# Patient Record
Sex: FEMALE | Race: WHITE | Employment: PART TIME | URBAN - METROPOLITAN AREA
[De-identification: names, ages, dates, MRNs, and addresses within clinical notes are randomized per-mention and may not be internally consistent; named-entity substitution may affect disease eponyms.]

---

## 2018-04-23 ENCOUNTER — HOSPITAL ENCOUNTER (OUTPATIENT)
Age: 63
Discharge: HOME | End: 2018-04-25
Payer: COMMERCIAL

## 2018-04-23 LAB
ALBUMIN SERPL-MCNC: 4.2 G/DL (ref 3.5–5.2)
ALP BLD-CCNC: 151 U/L (ref 35–104)
ALT SERPL-CCNC: 85 U/L (ref 0–32)
ANION GAP SERPL CALCULATED.3IONS-SCNC: 13 MMOL/L (ref 7–16)
AST SERPL-CCNC: 80 U/L (ref 0–31)
BILIRUB SERPL-MCNC: 0.5 MG/DL (ref 0–1.2)
BILIRUBIN DIRECT: <0.2 MG/DL (ref 0–0.3)
BILIRUBIN, INDIRECT: ABNORMAL MG/DL (ref 0–1)
BUN BLDV-MCNC: 13 MG/DL (ref 8–23)
CALCIUM SERPL-MCNC: 10.2 MG/DL (ref 8.6–10.2)
CHLORIDE BLD-SCNC: 101 MMOL/L (ref 98–107)
CO2: 26 MMOL/L (ref 22–29)
CREAT SERPL-MCNC: 0.7 MG/DL (ref 0.5–1)
GFR AFRICAN AMERICAN: >60
GFR NON-AFRICAN AMERICAN: >60 ML/MIN/1.73
GLUCOSE BLD-MCNC: 104 MG/DL (ref 74–109)
PHOSPHORUS: 3.1 MG/DL (ref 2.5–4.5)
POTASSIUM SERPL-SCNC: 4.3 MMOL/L (ref 3.5–5)
SODIUM BLD-SCNC: 140 MMOL/L (ref 132–146)
TOTAL PROTEIN: 7.2 G/DL (ref 6.4–8.3)
TSH SERPL DL<=0.05 MIU/L-ACNC: 2.3 UIU/ML (ref 0.27–4.2)

## 2018-04-23 PROCEDURE — 82247 BILIRUBIN TOTAL: CPT

## 2018-04-23 PROCEDURE — 84450 TRANSFERASE (AST) (SGOT): CPT

## 2018-04-23 PROCEDURE — 84460 ALANINE AMINO (ALT) (SGPT): CPT

## 2018-04-23 PROCEDURE — 84443 ASSAY THYROID STIM HORMONE: CPT

## 2018-04-23 PROCEDURE — 80069 RENAL FUNCTION PANEL: CPT

## 2018-04-23 PROCEDURE — 84155 ASSAY OF PROTEIN SERUM: CPT

## 2018-04-23 PROCEDURE — 84075 ASSAY ALKALINE PHOSPHATASE: CPT

## 2018-04-23 PROCEDURE — 82248 BILIRUBIN DIRECT: CPT

## 2018-04-23 PROCEDURE — 36415 COLL VENOUS BLD VENIPUNCTURE: CPT

## 2018-07-20 ENCOUNTER — HOSPITAL ENCOUNTER (OUTPATIENT)
Age: 63
Discharge: HOME | End: 2018-07-22
Admitting: INTERNAL MEDICINE
Payer: COMMERCIAL

## 2018-07-20 PROCEDURE — 84075 ASSAY ALKALINE PHOSPHATASE: CPT

## 2018-07-20 PROCEDURE — 84460 ALANINE AMINO (ALT) (SGPT): CPT

## 2018-07-20 PROCEDURE — 80069 RENAL FUNCTION PANEL: CPT

## 2018-07-20 PROCEDURE — 84443 ASSAY THYROID STIM HORMONE: CPT

## 2018-07-20 PROCEDURE — 82248 BILIRUBIN DIRECT: CPT

## 2018-07-20 PROCEDURE — 82247 BILIRUBIN TOTAL: CPT

## 2018-07-20 PROCEDURE — 84155 ASSAY OF PROTEIN SERUM: CPT

## 2018-07-20 PROCEDURE — 84450 TRANSFERASE (AST) (SGOT): CPT

## 2018-07-20 PROCEDURE — 36415 COLL VENOUS BLD VENIPUNCTURE: CPT

## 2018-07-21 LAB
ALBUMIN SERPL-MCNC: 4.3 G/DL (ref 3.5–5.2)
ALP BLD-CCNC: 173 U/L (ref 35–104)
ALT SERPL-CCNC: 72 U/L (ref 0–32)
ANION GAP SERPL CALCULATED.3IONS-SCNC: 14 MMOL/L (ref 7–16)
AST SERPL-CCNC: 65 U/L (ref 0–31)
BILIRUB SERPL-MCNC: 0.5 MG/DL (ref 0–1.2)
BILIRUBIN DIRECT: <0.2 MG/DL (ref 0–0.3)
BILIRUBIN, INDIRECT: ABNORMAL MG/DL (ref 0–1)
BUN BLDV-MCNC: 11 MG/DL (ref 8–23)
CALCIUM SERPL-MCNC: 10.6 MG/DL (ref 8.6–10.2)
CHLORIDE BLD-SCNC: 102 MMOL/L (ref 98–107)
CO2: 27 MMOL/L (ref 22–29)
CREAT SERPL-MCNC: 0.7 MG/DL (ref 0.5–1)
GFR AFRICAN AMERICAN: >60
GFR NON-AFRICAN AMERICAN: >60 ML/MIN/1.73
GLUCOSE BLD-MCNC: 97 MG/DL (ref 74–109)
PHOSPHORUS: 2.9 MG/DL (ref 2.5–4.5)
POTASSIUM SERPL-SCNC: 4.2 MMOL/L (ref 3.5–5)
SODIUM BLD-SCNC: 143 MMOL/L (ref 132–146)
TOTAL PROTEIN: 7.3 G/DL (ref 6.4–8.3)
TSH SERPL DL<=0.05 MIU/L-ACNC: 4.65 UIU/ML (ref 0.27–4.2)

## 2018-07-24 ENCOUNTER — HOSPITAL ENCOUNTER (OUTPATIENT)
Age: 63
Discharge: HOME | End: 2018-07-26
Payer: COMMERCIAL

## 2018-07-24 PROCEDURE — G0328 FECAL BLOOD SCRN IMMUNOASSAY: HCPCS

## 2018-07-25 LAB — OCCULT BLOOD SCREENING: NORMAL

## 2018-08-31 ENCOUNTER — HOSPITAL ENCOUNTER (EMERGENCY)
Age: 63
Discharge: HOME | End: 2018-08-31
Payer: COMMERCIAL

## 2018-08-31 ENCOUNTER — APPOINTMENT (OUTPATIENT)
Dept: GENERAL RADIOLOGY | Age: 63
End: 2018-08-31
Payer: COMMERCIAL

## 2018-08-31 VITALS
OXYGEN SATURATION: 97 % | SYSTOLIC BLOOD PRESSURE: 129 MMHG | WEIGHT: 218 LBS | DIASTOLIC BLOOD PRESSURE: 70 MMHG | TEMPERATURE: 97.8 F | HEART RATE: 91 BPM | RESPIRATION RATE: 16 BRPM

## 2018-08-31 DIAGNOSIS — S42.92XA CLOSED FRACTURE OF LEFT SHOULDER, INITIAL ENCOUNTER: Primary | ICD-10-CM

## 2018-08-31 PROCEDURE — 99212 OFFICE O/P EST SF 10 MIN: CPT

## 2018-08-31 PROCEDURE — 73060 X-RAY EXAM OF HUMERUS: CPT

## 2018-08-31 PROCEDURE — 73030 X-RAY EXAM OF SHOULDER: CPT

## 2018-08-31 RX ORDER — OXYCODONE HYDROCHLORIDE AND ACETAMINOPHEN 5; 325 MG/1; MG/1
1 TABLET ORAL EVERY 6 HOURS PRN
Qty: 12 TABLET | Refills: 0 | Status: SHIPPED | OUTPATIENT
Start: 2018-08-31 | End: 2018-09-03

## 2018-08-31 RX ORDER — PAROXETINE HYDROCHLORIDE 20 MG/1
20 TABLET, FILM COATED ORAL EVERY MORNING
COMMUNITY

## 2018-08-31 RX ORDER — LEVOTHYROXINE SODIUM 0.1 MG/1
100 TABLET ORAL DAILY
COMMUNITY

## 2018-09-04 ENCOUNTER — OFFICE VISIT (OUTPATIENT)
Dept: ORTHOPEDIC SURGERY | Age: 63
End: 2018-09-04
Payer: COMMERCIAL

## 2018-09-04 VITALS — HEIGHT: 63 IN | TEMPERATURE: 98 F | WEIGHT: 218 LBS | BODY MASS INDEX: 38.62 KG/M2

## 2018-09-04 DIAGNOSIS — S42.292A OTHER CLOSED DISPLACED FRACTURE OF PROXIMAL END OF LEFT HUMERUS, INITIAL ENCOUNTER: Primary | ICD-10-CM

## 2018-09-04 PROCEDURE — 23600 CLTX PROX HUMRL FX W/O MNPJ: CPT | Performed by: ORTHOPAEDIC SURGERY

## 2018-09-04 PROCEDURE — 99204 OFFICE O/P NEW MOD 45 MIN: CPT | Performed by: ORTHOPAEDIC SURGERY

## 2018-09-04 RX ORDER — OXYCODONE HYDROCHLORIDE AND ACETAMINOPHEN 5; 325 MG/1; MG/1
1 TABLET ORAL EVERY 6 HOURS PRN
Qty: 28 TABLET | Refills: 0 | Status: SHIPPED | OUTPATIENT
Start: 2018-09-04 | End: 2018-09-11 | Stop reason: SDUPTHER

## 2018-09-04 NOTE — PROGRESS NOTES
cancer. Musculoskeletal: (-) fractures,  (-) dislocations,(-) collagen vascular disease, (-) fibromyalgia, (-) multiple sclerosis, (-) muscular dystrophy, (-) RSD,(-) joint pain (-)swelling, (-) joint pain,swelling. Neurologic: (-) epilepsy, (-)seizures,(-) brain tumor,(-) TIA, (-)stroke, (-)headaches, (-)Parkinson disease,(-) memory loss, (-) LOC. Cardiovascular: (-) Chest pain, (-) swelling in legs/feet, (-) SOB, (-) cramping in legs/feet with walking. Respiratory: (-) SOB, (-) Coughing, (-) night sweats. GI: (-) nausea, (-) vomiting, (-) diarrhea, (-) blood in stool, (-) gastric ulcer. Psychiatric: (-) Depression, (-) Anxiety, (-) bipolar disease, (-) Alzheimer's Disease  Allergic/Immunologic: (-) allergies latex, (-) allergies metal, (-) skin sensitivity. Hematlogic: (-) anemia, (-) blood transfusion, (-) DVT/PE, (-) Clotting disorders      Subjective:    Constitution:  Temp 98 °F (36.7 °C)   Ht 5' 3\" (1.6 m)   Wt 218 lb (98.9 kg)   BMI 38.62 kg/m²       Psycihatric:  The patient is alert and oriented x 3, appears to be stated age and in no distress. Respiratory:  Respiratory effort is not labored. Patient is not gasping. Palpation of the chest reveals no tactile fremitus. Skin:  Upon inspection: the skin appears warm, dry and intact. There is not a previous scar over the affected area. There is not any cellulitis, lymphedema or cutaneous lesions noted in the lower extremities. Upon palpation there is no induration noted. Neurologic:  Motor exam of the upper extremities show: The reflexes in biceps/triceps/brachioradialis are equal and symmetric. Sensory exam C5-T1 are normal bilaterally. Cardiovascular: The vascular exam is normal and is well perfused to distal extremities. There are 2+ radial pulses bilaterally, and motor and sensation is intact to median, ulnar, and radial, musclocutaneus, and axillary nerve distribution and grossly symmetric bilaterally.   There is cap refill noted less than two seconds in all digits. There is not edema of the bilateral upper extremities. There is not varicosities noted in the distal extremities. Lymph:  Upon palpation,  there is no lymphadenopathy noted in bilateral upper extremities. Musculoskeletal:  Gait: normal; examination of the nails and digits reveal no cyanosis or clubbing. Cervical Exam:  On physical exam, Zoila Boykin is well-developed, well-nourished, oriented to person, place and time. her gait is normal.  On evaluation of hercervical spine, She has full range of motion of the cervical spine without pain. There is no cervical tenderness to palpation. Shoulder Exam:  On evaluation of her bilaterally upper extremities, her left shoulder has no deformity. There is tenderness upon palpation of the proximal humersus. There is not evidence of scapular dyskinesis. There is not muscle atrophy in shoulder girdle. The range of motion for the Right Shoulder is 160/45/t10 and for the Left shoulder range was not assessed due to injury. Right shoulder Motor strength is 5/5 in the supraspinatus, 5/5 internal rotation and 5/5 in external rotation, and Left shoulder motor strength n/a/5 in supraspinatus, n/a/5 in internal rotation, n/a/5 in external rotation. Right shoulder:  negative Impingement , negative Olivares ,negative  Speeds,negative  Apprehension ,negative Sawyer Load Shift, negative Lyn manuver, negative Cross arm test.     Left shoulder:  Skin intact  Sensation intact  +2 radial pulses  AROM fingers  R/U/M nerves intact    XRAY:    Redemonstrated is a comminuted, impacted, mildly displaced left   proximal humerus fracture through the surgical neck, not significantly   change in alignment. The glenohumeral and acromioclavicular joints are   maintained. Visualized portion of the left lung is grossly clear.          MRI:  na    Radiographic findings reviewed with patient    Impression:   Encounter

## 2018-09-04 NOTE — PROGRESS NOTES
Viridiana Young is a 58y.o. year old {Desc; male/female:67665} who presents today for evaluation of a {DESC; LEFT/RIGHT/BI:16000} *** injury which occurred on ***. The patient reports that this injury occurred when ***. The patient denies any other injuries. Movement makes the pain worse, the splint and resting makes the pain better. The patient's past medical history, medications, and review of systems was reviewed. On Physical Exam, Viridiana Young is well-developed, well-nourished, and oriented to person, place and time. her gait is intact. On evaluation of her {DESC; LEFT/RIGHT/BI:24319} {Desc; upper/lower:40455} extremity, there {IS/IS NOT:9024} obvious deformity. There is swelling and {IS/IS NOT:9024} ecchymosis. she is tender to palpation over the ***, and otherwise nontender over the remainder of the extremity. Range of motion is decreased secondary to pain over the {DESC; LEFT/RIGHT/BI:75335} ***. The skin overlying the {DESC; LEFT/RIGHT/BI:21016} *** is intact without evidence of lesion, laceration or abrasion. Distal pulses are 2+ and symmetric bilaterally. Sensation is grossly intact to light touch and symmetric bilaterally. Xrays:  Xrays dated *** were reviewed.   ***    Impression:  {DESC; LEFT/RIGHT/BI:39813} *** Fracture    Plan: ***

## 2018-09-10 DIAGNOSIS — M25.512 ACUTE PAIN OF LEFT SHOULDER: Primary | ICD-10-CM

## 2018-09-11 ENCOUNTER — OFFICE VISIT (OUTPATIENT)
Dept: ORTHOPEDIC SURGERY | Age: 63
End: 2018-09-11

## 2018-09-11 VITALS — HEIGHT: 63 IN | BODY MASS INDEX: 38.62 KG/M2 | TEMPERATURE: 98 F | WEIGHT: 218 LBS

## 2018-09-11 DIAGNOSIS — S42.292A OTHER CLOSED DISPLACED FRACTURE OF PROXIMAL END OF LEFT HUMERUS, INITIAL ENCOUNTER: Primary | ICD-10-CM

## 2018-09-11 PROCEDURE — 99024 POSTOP FOLLOW-UP VISIT: CPT | Performed by: ORTHOPAEDIC SURGERY

## 2018-09-11 RX ORDER — OXYCODONE HYDROCHLORIDE AND ACETAMINOPHEN 5; 325 MG/1; MG/1
1 TABLET ORAL EVERY 6 HOURS PRN
Qty: 28 TABLET | Refills: 0 | Status: SHIPPED | OUTPATIENT
Start: 2018-09-11 | End: 2018-09-18

## 2018-09-11 NOTE — PROGRESS NOTES
Chief Complaint   Patient presents with    Arm Pain     Left arm, Humerus Fx, DOI 8/31/18        Catie Montes is a 58y.o. year old   female who is seen today  for evaluation of left humerus fracture. She has been compliant in sling and non weight bearing. Chief Complaint   Patient presents with    Arm Pain     Left arm, Humerus Fx, DOI 8/31/18     Past Medical History:   Diagnosis Date    Thyroid disease      Past Surgical History:   Procedure Laterality Date    FRACTURE SURGERY      toe    HYSTERECTOMY      THYROIDECTOMY         Current Outpatient Prescriptions:     oxyCODONE-acetaminophen (PERCOCET) 5-325 MG per tablet, Take 1 tablet by mouth every 6 hours as needed for Pain for up to 7 days. ., Disp: 28 tablet, Rfl: 0    PARoxetine (PAXIL) 20 MG tablet, Take 20 mg by mouth every morning, Disp: , Rfl:     levothyroxine (SYNTHROID) 100 MCG tablet, Take 100 mcg by mouth Daily, Disp: , Rfl:   No Known Allergies  Social History     Social History    Marital status:      Spouse name: N/A    Number of children: N/A    Years of education: N/A     Occupational History    Not on file. Social History Main Topics    Smoking status: Never Smoker    Smokeless tobacco: Not on file    Alcohol use No    Drug use: No    Sexual activity: No     Other Topics Concern    Not on file     Social History Narrative    No narrative on file     History reviewed. No pertinent family history. REVIEW OF SYSTEMS:     General/Constitution:  (-)weight loss, (-)fever, (-)chills, (-)weakness. Skin: (-) rash,(-) psoriasis,(-) eczema, (-)skin cancer. Musculoskeletal: (-) fractures,  (-) dislocations,(-) collagen vascular disease, (-) fibromyalgia, (-) multiple sclerosis, (-) muscular dystrophy, (-) RSD,(-) joint pain (-)swelling, (-) joint pain,swelling.   Neurologic: (-) epilepsy, (-)seizures,(-) brain tumor,(-) TIA, (-)stroke, (-)headaches, (-)Parkinson disease,(-) memory loss, (-)

## 2018-09-19 ENCOUNTER — TELEPHONE (OUTPATIENT)
Dept: ORTHOPEDIC SURGERY | Age: 63
End: 2018-09-19

## 2018-09-19 DIAGNOSIS — S42.292A OTHER CLOSED DISPLACED FRACTURE OF PROXIMAL END OF LEFT HUMERUS, INITIAL ENCOUNTER: Primary | ICD-10-CM

## 2018-09-19 RX ORDER — OXYCODONE AND ACETAMINOPHEN 10; 325 MG/1; MG/1
1 TABLET ORAL EVERY 6 HOURS PRN
Qty: 28 TABLET | Refills: 0 | Status: SHIPPED | OUTPATIENT
Start: 2018-09-19 | End: 2018-09-27 | Stop reason: SDUPTHER

## 2018-09-19 NOTE — TELEPHONE ENCOUNTER
I increased her pain medication to percocet 10/325. Please tell her to be sure to take one before she goes to bed as that will help with the night pain.

## 2018-09-27 ENCOUNTER — TELEPHONE (OUTPATIENT)
Dept: ORTHOPEDIC SURGERY | Age: 63
End: 2018-09-27

## 2018-09-27 DIAGNOSIS — S42.292A OTHER CLOSED DISPLACED FRACTURE OF PROXIMAL END OF LEFT HUMERUS, INITIAL ENCOUNTER: ICD-10-CM

## 2018-09-27 RX ORDER — OXYCODONE AND ACETAMINOPHEN 10; 325 MG/1; MG/1
1 TABLET ORAL EVERY 6 HOURS PRN
Qty: 28 TABLET | Refills: 0 | Status: SHIPPED | OUTPATIENT
Start: 2018-09-27 | End: 2018-10-04

## 2018-09-27 NOTE — TELEPHONE ENCOUNTER
.Patient called in requesting refill of Oxycodone  mg    [facility] [address] [place], [place] - [address]

## 2018-10-08 DIAGNOSIS — M25.512 ACUTE PAIN OF LEFT SHOULDER: Primary | ICD-10-CM

## 2018-10-09 ENCOUNTER — OFFICE VISIT (OUTPATIENT)
Dept: ORTHOPEDIC SURGERY | Age: 63
End: 2018-10-09

## 2018-10-09 VITALS — TEMPERATURE: 98 F | BODY MASS INDEX: 38.62 KG/M2 | WEIGHT: 218 LBS | HEIGHT: 63 IN

## 2018-10-09 DIAGNOSIS — S42.292A OTHER CLOSED DISPLACED FRACTURE OF PROXIMAL END OF LEFT HUMERUS, INITIAL ENCOUNTER: Primary | ICD-10-CM

## 2018-10-09 PROCEDURE — 99024 POSTOP FOLLOW-UP VISIT: CPT | Performed by: ORTHOPAEDIC SURGERY

## 2018-10-23 ENCOUNTER — EVALUATION (OUTPATIENT)
Dept: PHYSICAL THERAPY | Age: 63
End: 2018-10-23
Payer: COMMERCIAL

## 2018-10-23 DIAGNOSIS — S42.292D CLOSED FRACTURE OF HEAD OF LEFT HUMERUS WITH ROUTINE HEALING, SUBSEQUENT ENCOUNTER: Primary | ICD-10-CM

## 2018-10-23 PROCEDURE — 97110 THERAPEUTIC EXERCISES: CPT | Performed by: PHYSICAL THERAPIST

## 2018-10-23 PROCEDURE — 97162 PT EVAL MOD COMPLEX 30 MIN: CPT | Performed by: PHYSICAL THERAPIST

## 2018-10-23 NOTE — PROGRESS NOTES
[facility] OUTPATIENT REHABILITATION  PHYSICAL THERAPY INITIAL EVALUATION         Date:  10/23/2018   Patient: Quita Schaeffer  : 1955  MRN: [id number]  Referring Provider: Alec Jeff DO   [facility]     Medical Diagnosis:   L10.019Q (ICD-10-CM) - Other closed displaced fracture of proximal end of left humerus, initial encounter    Onset date: 18    Onset[de-identified] Insidious onset    Mechanism of Injury: fell off [employer]    Chief complaint: pain and can't use L UE     SUBJECTIVE:     Pat Medical History  Past Medical History:   Diagnosis Date    Thyroid disease      Past Surgical History:   Procedure Laterality Date    FRACTURE SURGERY      toe    HYSTERECTOMY      THYROIDECTOMY         Medications:   Current Outpatient Prescriptions   Medication Sig Dispense Refill    PARoxetine (PAXIL) 20 MG tablet Take 20 mg by mouth every morning      levothyroxine (SYNTHROID) 100 MCG tablet Take 100 mcg by mouth Daily       No current facility-administered medications for this visit. Imaging results: Xr Shoulder Left (min 2 Views)    Result Date: 10/9/2018  Reading location: 200 INDICATION: Fracture, follow-up FINDINGS: 3 views left shoulder compared 2018. Some healing callus formation and impaction at the fracture site. Intact glenohumeral and acromioclavicular alignment. Interval impaction and healing callus formation at proximal left humerus fracture. 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

## 2018-10-24 ENCOUNTER — TREATMENT (OUTPATIENT)
Dept: PHYSICAL THERAPY | Age: 63
End: 2018-10-24
Payer: COMMERCIAL

## 2018-10-24 DIAGNOSIS — S42.292D CLOSED FRACTURE OF HEAD OF LEFT HUMERUS WITH ROUTINE HEALING, SUBSEQUENT ENCOUNTER: Primary | ICD-10-CM

## 2018-10-24 PROCEDURE — 97110 THERAPEUTIC EXERCISES: CPT

## 2018-10-30 ENCOUNTER — TREATMENT (OUTPATIENT)
Dept: PHYSICAL THERAPY | Age: 63
End: 2018-10-30
Payer: COMMERCIAL

## 2018-10-30 DIAGNOSIS — S42.292D CLOSED FRACTURE OF HEAD OF LEFT HUMERUS WITH ROUTINE HEALING, SUBSEQUENT ENCOUNTER: Primary | ICD-10-CM

## 2018-10-30 PROCEDURE — 97110 THERAPEUTIC EXERCISES: CPT

## 2018-10-30 NOTE — PROGRESS NOTES
Physical Therapy Daily Treatment Note    Date: 10/30/2018  Patient Name: Helio Garcia  : 1955   MRN: [id number]  DOInjury: 18  DOSx: none   Referring Provider:     Medical Diagnosis:   Geovany Garcia, [address], [place]        G34.151U (ICD-10-CM) - Other closed displaced fracture of proximal end of left humerus, initial encounter    Outcome Measure:     S: Pt reported dull pain all the time that increased with reaching in front, behind, doing hair, and when getting dressed. Pain is located below shoulder in bicep region. O:  Time 900-1005     Visit 3     Pain 5/10     ROM Left Shoulder:  AROM: 83° Forward elevation, 30° ER in 60 ABD in scapular plane, IR to L5 w/ marked pain  PROM: 110° Forward elevation, 60° ER in scapular plane , 45° IR     Modalities      Ice 15 min     Manual                  Stretch      Table slides      Wall Flexion      Wall ER stretch      Towel IR stretch      IR reaching behind back      Exercise      Shrugs AROM      Pendulum Ex      UBE      Pulleys - flex 5 minutes     Pulleys-IR      Supine wand chest press 2 x 20     Supine wand flex 2 x 20     Supine wand ER/IR 2 x 20     Standing wand IR across back 2 x 20     S-lying ABD      S-lying ER      Standing wand flex      Standing flexion      Standing ABD            ROWS:  Functional activities  To aid in ROM and strength needed for reaching , lifting ,pushing and pulling at home/work    ROWS: M  \"    ROWS: L  \"    ER  \"    IR  \"                A:  Tolerated treatment well.   P: Continue with rehab plan  Magnolia Reveles PTA    Treatment Charges: Mins Units   Initial Evaluation     Re-Evaluation     Ther Exercise         TE 45 3   Manual Therapy     MT     Ther Activities        TA     Gait Training          GT     Neuro Re-education NR     Modalities     Non-Billable Service Time 15    Other     Total Time/Units 60 3

## 2018-11-01 ENCOUNTER — TREATMENT (OUTPATIENT)
Dept: PHYSICAL THERAPY | Age: 63
End: 2018-11-01
Payer: COMMERCIAL

## 2018-11-01 DIAGNOSIS — S42.292D CLOSED FRACTURE OF HEAD OF LEFT HUMERUS WITH ROUTINE HEALING, SUBSEQUENT ENCOUNTER: Primary | ICD-10-CM

## 2018-11-01 PROCEDURE — 97110 THERAPEUTIC EXERCISES: CPT

## 2018-11-01 NOTE — PROGRESS NOTES
Physical Therapy Daily Treatment Note    Date: 2018  Patient Name: Lexi Myers  : 1955   MRN: [id number]  DOInjury: 18  DOSx: none   Referring Provider:     Medical Diagnosis:   Giacomo Shay, [address], [facility] [place]        Q74.565G (ICD-10-CM) - Other closed displaced fracture of proximal end of left humerus, initial encounter    Outcome Measure:     S: Pt reported dull pain all the time that increased with reaching in front, behind, doing hair, and when getting dressed. Pain is located below shoulder in bicep region. O:  Gentle ROM  Time 900-1005     Visit 4     Pain 5/10     ROM Left Shoulder:  AROM: 83° Forward elevation, 30° ER in 60 ABD in scapular plane, IR to L5 w/ marked pain  PROM: 110° Forward elevation, 60° ER in scapular plane , 45° IR     Modalities      Ice 15 min     Manual                  Stretch      Table slides      Wall Flexion      Wall ER stretch      Towel IR stretch      IR reaching behind back      Exercise      Shrugs AROM      Pendulum Ex      UBE      Pulleys - flex 5 minutes     Pulleys-IR      Supine wand chest press 2 x 20     Supine wand flex 2 x 20     Supine wand ER/IR 2 x 20     Standing wand IR across back 2 x 20     S-lying flex 2 x 10     S-lying ER 2 x 10     Standing wand flex      Standing flexion      Standing ABD            ROWS:  Functional activities  To aid in ROM and strength needed for reaching , lifting ,pushing and pulling at home/work    ROWS: M  \"    ROWS: L  \"    ER  \"    IR  \"                A:  Tolerated treatment well.   P: Continue with rehab plan  Cecelia Wilson PTA    Treatment Charges: Mins Units   Initial Evaluation     Re-Evaluation     Ther Exercise         TE 45 3   Manual Therapy     MT     Ther Activities        TA     Gait Training          GT     Neuro Re-education NR     Modalities     Non-Billable Service Time 15    Other     Total Time/Units 60 3

## 2018-11-05 DIAGNOSIS — S42.292A OTHER CLOSED DISPLACED FRACTURE OF PROXIMAL END OF LEFT HUMERUS, INITIAL ENCOUNTER: Primary | ICD-10-CM

## 2018-11-06 ENCOUNTER — OFFICE VISIT (OUTPATIENT)
Dept: ORTHOPEDIC SURGERY | Age: 63
End: 2018-11-06

## 2018-11-06 ENCOUNTER — TREATMENT (OUTPATIENT)
Dept: PHYSICAL THERAPY | Age: 63
End: 2018-11-06
Payer: COMMERCIAL

## 2018-11-06 VITALS — BODY MASS INDEX: 38.62 KG/M2 | WEIGHT: 218 LBS | HEIGHT: 63 IN

## 2018-11-06 DIAGNOSIS — S42.292D OTHER CLOSED DISPLACED FRACTURE OF PROXIMAL END OF LEFT HUMERUS WITH ROUTINE HEALING, SUBSEQUENT ENCOUNTER: Primary | ICD-10-CM

## 2018-11-06 DIAGNOSIS — S42.292D CLOSED FRACTURE OF HEAD OF LEFT HUMERUS WITH ROUTINE HEALING, SUBSEQUENT ENCOUNTER: Primary | ICD-10-CM

## 2018-11-06 PROCEDURE — 99024 POSTOP FOLLOW-UP VISIT: CPT | Performed by: ORTHOPAEDIC SURGERY

## 2018-11-06 PROCEDURE — 97110 THERAPEUTIC EXERCISES: CPT

## 2018-11-08 ENCOUNTER — TREATMENT (OUTPATIENT)
Dept: PHYSICAL THERAPY | Age: 63
End: 2018-11-08
Payer: COMMERCIAL

## 2018-11-08 DIAGNOSIS — S42.292D CLOSED FRACTURE OF HEAD OF LEFT HUMERUS WITH ROUTINE HEALING, SUBSEQUENT ENCOUNTER: Primary | ICD-10-CM

## 2018-11-08 PROCEDURE — 97110 THERAPEUTIC EXERCISES: CPT

## 2018-11-12 ENCOUNTER — TREATMENT (OUTPATIENT)
Dept: PHYSICAL THERAPY | Age: 63
End: 2018-11-12
Payer: COMMERCIAL

## 2018-11-12 DIAGNOSIS — S42.292D CLOSED FRACTURE OF HEAD OF LEFT HUMERUS WITH ROUTINE HEALING, SUBSEQUENT ENCOUNTER: Primary | ICD-10-CM

## 2018-11-12 PROCEDURE — 97110 THERAPEUTIC EXERCISES: CPT

## 2018-11-14 ENCOUNTER — TREATMENT (OUTPATIENT)
Dept: PHYSICAL THERAPY | Age: 63
End: 2018-11-14
Payer: COMMERCIAL

## 2018-11-14 DIAGNOSIS — S42.292D CLOSED FRACTURE OF HEAD OF LEFT HUMERUS WITH ROUTINE HEALING, SUBSEQUENT ENCOUNTER: Primary | ICD-10-CM

## 2018-11-14 PROCEDURE — 97110 THERAPEUTIC EXERCISES: CPT

## 2018-11-14 NOTE — PROGRESS NOTES
Physical Therapy Daily Treatment Note    Date: 2018  Patient Name: Albert Burroughs  : 1955   MRN: [id number]  [facility]: 18  DOSx: none   Referring Provider:     Medical Diagnosis:   Kwadwojane Antunez, [address], [place]        N23.944F (ICD-10-CM) - Other closed displaced fracture of proximal end of left humerus, initial encounter    Outcome Measure:     S: Pt reports a little sore today  O:  Gentle ROM  Time 14:00-15:00     Visit 8     Pain 5/10     ROM Left Shoulder:  AROM: 83° Forward elevation, 30° ER in 60 ABD in scapular plane, IR to L5 w/ marked pain  PROM: 110° Forward elevation, 60° ER in scapular plane , 45° IR     Modalities      Ice 15 min     Manual                  Stretch      Table slides      Wall Flexion      Wall ER stretch      Towel IR stretch      IR reaching behind back      Exercise      Shrugs AROM      Pendulum Ex      UBE      Pulleys - flex 5 minutes     Pulleys-IR      Supine wand chest press 2 x 20     Supine wand flex 2 x 20     Supine wand ER/IR 2 x 20     Standing wand IR across back 2 x 20     S-lying flex 2 x 10     S-lying ER 2 x 10     Standing wand flex      Standing flexion      Standing ABD            ROWS:  Functional activities  To aid in ROM and strength needed for reaching , lifting ,pushing and pulling at home/work    ROWS: M  \"    ROWS: L  \"    ER  \"    IR  \"                A:  Tolerated treatment well.   P: Continue with rehab plan  Aram Warner PTA    Treatment Charges: Mins Units   Initial Evaluation     Re-Evaluation     Ther Exercise         TE 45 3   Manual Therapy     MT     Ther Activities        TA     Gait Training          GT     Neuro Re-education NR     Modalities     Non-Billable Service Time 15    Other     Total Time/Units 60 3

## 2018-11-17 ENCOUNTER — HOSPITAL ENCOUNTER (OUTPATIENT)
Age: 63
Discharge: HOME | End: 2018-11-19
Payer: COMMERCIAL

## 2018-11-17 LAB
ALBUMIN SERPL-MCNC: 4 G/DL (ref 3.5–5.2)
ALP BLD-CCNC: 184 U/L (ref 35–104)
ALT SERPL-CCNC: 57 U/L (ref 0–32)
ANION GAP SERPL CALCULATED.3IONS-SCNC: 15 MMOL/L (ref 7–16)
AST SERPL-CCNC: 62 U/L (ref 0–31)
BILIRUB SERPL-MCNC: 0.3 MG/DL (ref 0–1.2)
BILIRUBIN DIRECT: <0.2 MG/DL (ref 0–0.3)
BILIRUBIN, INDIRECT: ABNORMAL MG/DL (ref 0–1)
BUN BLDV-MCNC: 15 MG/DL (ref 8–23)
CALCIUM SERPL-MCNC: 10.3 MG/DL (ref 8.6–10.2)
CHLORIDE BLD-SCNC: 103 MMOL/L (ref 98–107)
CO2: 24 MMOL/L (ref 22–29)
CREAT SERPL-MCNC: 0.6 MG/DL (ref 0.5–1)
GFR AFRICAN AMERICAN: >60
GFR NON-AFRICAN AMERICAN: >60 ML/MIN/1.73
GLUCOSE BLD-MCNC: 154 MG/DL (ref 74–99)
PHOSPHORUS: 3.4 MG/DL (ref 2.5–4.5)
POTASSIUM SERPL-SCNC: 4 MMOL/L (ref 3.5–5)
SODIUM BLD-SCNC: 142 MMOL/L (ref 132–146)
TOTAL PROTEIN: 7.2 G/DL (ref 6.4–8.3)
TSH SERPL DL<=0.05 MIU/L-ACNC: 2.18 UIU/ML (ref 0.27–4.2)

## 2018-11-17 PROCEDURE — 36415 COLL VENOUS BLD VENIPUNCTURE: CPT

## 2018-11-17 PROCEDURE — 84075 ASSAY ALKALINE PHOSPHATASE: CPT

## 2018-11-17 PROCEDURE — 80069 RENAL FUNCTION PANEL: CPT

## 2018-11-17 PROCEDURE — 84443 ASSAY THYROID STIM HORMONE: CPT

## 2018-11-17 PROCEDURE — 84460 ALANINE AMINO (ALT) (SGPT): CPT

## 2018-11-17 PROCEDURE — 84450 TRANSFERASE (AST) (SGOT): CPT

## 2018-11-17 PROCEDURE — 84155 ASSAY OF PROTEIN SERUM: CPT

## 2018-11-17 PROCEDURE — 82247 BILIRUBIN TOTAL: CPT

## 2018-11-17 PROCEDURE — 82248 BILIRUBIN DIRECT: CPT

## 2018-11-19 ENCOUNTER — TREATMENT (OUTPATIENT)
Dept: PHYSICAL THERAPY | Age: 63
End: 2018-11-19
Payer: COMMERCIAL

## 2018-11-19 DIAGNOSIS — S42.292D CLOSED FRACTURE OF HEAD OF LEFT HUMERUS WITH ROUTINE HEALING, SUBSEQUENT ENCOUNTER: Primary | ICD-10-CM

## 2018-11-19 PROCEDURE — 97110 THERAPEUTIC EXERCISES: CPT

## 2018-11-26 ENCOUNTER — TREATMENT (OUTPATIENT)
Dept: PHYSICAL THERAPY | Age: 63
End: 2018-11-26
Payer: COMMERCIAL

## 2018-11-26 DIAGNOSIS — S42.292A OTHER CLOSED DISPLACED FRACTURE OF PROXIMAL END OF LEFT HUMERUS, INITIAL ENCOUNTER: Primary | ICD-10-CM

## 2018-11-26 PROCEDURE — 97110 THERAPEUTIC EXERCISES: CPT

## 2018-11-28 ENCOUNTER — TREATMENT (OUTPATIENT)
Dept: PHYSICAL THERAPY | Age: 63
End: 2018-11-28
Payer: COMMERCIAL

## 2018-11-28 DIAGNOSIS — S42.292A OTHER CLOSED DISPLACED FRACTURE OF PROXIMAL END OF LEFT HUMERUS, INITIAL ENCOUNTER: Primary | ICD-10-CM

## 2018-11-28 PROCEDURE — 97110 THERAPEUTIC EXERCISES: CPT

## 2018-12-04 ENCOUNTER — TREATMENT (OUTPATIENT)
Dept: PHYSICAL THERAPY | Age: 63
End: 2018-12-04
Payer: COMMERCIAL

## 2018-12-04 DIAGNOSIS — S42.292A OTHER CLOSED DISPLACED FRACTURE OF PROXIMAL END OF LEFT HUMERUS, INITIAL ENCOUNTER: Primary | ICD-10-CM

## 2018-12-04 PROCEDURE — 97110 THERAPEUTIC EXERCISES: CPT

## 2018-12-04 NOTE — PROGRESS NOTES
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 O:  Gentle ROM  Time 9:30-10:30     Visit 12     Pain 5/10     ROM Left Shoulder:  AROM: 83° Forward elevation, 30° ER in 60 ABD in scapular plane, IR to L5 w/ marked pain  PROM: 110° Forward elevation, 60° ER in scapular plane , 45° IR     Modalities      Ice 15 min     Manual                  Stretch      Table slides      Wall Flexion      Wall ER stretch      Towel IR stretch      IR reaching behind back      Exercise      Shrugs AROM      Pendulum Ex      UBE 2/2     Pulleys - flex 5 minutes     Pulleys-IR      Supine wand chest press 2 x 20     Supine wand flex 2 x 20     Supine wand ER/IR 2 x 20     Supine flex 2 x 20     Standing wand IR across back 2 x 20     S-lying flex 2 x 20     S-lying ER 2 x 20     Standing wand chest press 2 x 10 Cicuit    Standing 1 arm press 2 X 10    \"    Standing flexion      Standing ABD      Finger ladder 5x    L13     ROWS:  Functional activities  To aid in ROM and strength needed for reaching , lifting ,pushing and pulling at home/work    ROWS: M  \"    ROWS: L  \"    ER  \"    IR  \"                A:  Tolerated treatment well.   P: Continue with rehab plan  Asim Justin PTA    Treatment Charges: Mins Units   Initial Evaluation     Re-Evaluation     Ther Exercise         TE 45 3   Manual Therapy     MT     Ther Activities        TA     Gait Training          GT     Neuro Re-education NR     Modalities     Non-Billable Service Time 10 ice    Other     Total Time/Units 55 3

## 2018-12-06 ENCOUNTER — TREATMENT (OUTPATIENT)
Dept: PHYSICAL THERAPY | Age: 63
End: 2018-12-06
Payer: COMMERCIAL

## 2018-12-06 DIAGNOSIS — S42.292A OTHER CLOSED DISPLACED FRACTURE OF PROXIMAL END OF LEFT HUMERUS, INITIAL ENCOUNTER: Primary | ICD-10-CM

## 2018-12-06 PROCEDURE — 97110 THERAPEUTIC EXERCISES: CPT

## 2018-12-11 ENCOUNTER — TREATMENT (OUTPATIENT)
Dept: PHYSICAL THERAPY | Age: 63
End: 2018-12-11
Payer: COMMERCIAL

## 2018-12-11 DIAGNOSIS — S42.292A OTHER CLOSED DISPLACED FRACTURE OF PROXIMAL END OF LEFT HUMERUS, INITIAL ENCOUNTER: Primary | ICD-10-CM

## 2018-12-11 PROCEDURE — 97110 THERAPEUTIC EXERCISES: CPT

## 2018-12-11 NOTE — PROGRESS NOTES
Physical Therapy Daily Treatment Note    Date: 2018  Patient Name: Roselyn Carlisle  : 1955   MRN: [id number]  DOInjury: 18  DOSx: none   Referring Provider:     Medical Diagnosis:   Terrance Barnes, [address] [facility], [place]        G11.485G (ICD-10-CM) - Other closed displaced fracture of proximal end of left humerus, initial encounter    Outcome Measure:     S: Pt c/o continued soreness. O:  Gentle ROM  Time 3095-4329 am     Visit 14     Pain 5/10     ROM Left Shoulder:  AROM: 83° Forward elevation, 30° ER in 60 ABD in scapular plane, IR to L5 w/ marked pain  PROM: 110° Forward elevation, 60° ER in scapular plane , 45° IR     Modalities      Ice 15 min     Manual                  Stretch      Table slides      Wall Flexion      Wall ER stretch      Towel IR stretch      IR reaching behind back      Exercise      Shrugs AROM      Pendulum Ex      UBE 2/2     Pulleys - flex 5 minutes     Pulleys-IR 3 min     Supine wand chest press 2 x 20     Supine wand flex 2 x 20     Supine wand ER/IR 2 x 20     Supine flex 2 x 20     Standing wand IR across back 2 x 20     S-lying flex 2 x 20     S-lying ER 2 x 20     Standing wand chest press 2 x 10 Cicuit    Standing 1 arm press 2 X 10    \"    Standing flexion      Standing ABD      Finger ladder 5x    L13     ROWS:  Functional activities  To aid in ROM and strength needed for reaching , lifting ,pushing and pulling at home/work    ROWS: M  \"    ROWS: L  \"    ER  \"    IR  \"          Other           A:  Tolerated treatment well.   P: Continue with rehab plan  Victoriano Boles PTA    Treatment Charges: Mins Units   Initial Evaluation     Re-Evaluation     Ther Exercise         TE 35 2   Manual Therapy     MT     Ther Activities        TA     Gait Training          GT     Neuro Re-education NR     Modalities     Non-Billable Service Time 10 ice 0   Other     Total Time/Units 45 2

## 2018-12-13 ENCOUNTER — TREATMENT (OUTPATIENT)
Dept: PHYSICAL THERAPY | Age: 63
End: 2018-12-13
Payer: COMMERCIAL

## 2018-12-13 DIAGNOSIS — S42.292A OTHER CLOSED DISPLACED FRACTURE OF PROXIMAL END OF LEFT HUMERUS, INITIAL ENCOUNTER: Primary | ICD-10-CM

## 2018-12-13 PROCEDURE — 97110 THERAPEUTIC EXERCISES: CPT

## 2018-12-18 ENCOUNTER — TREATMENT (OUTPATIENT)
Dept: PHYSICAL THERAPY | Age: 63
End: 2018-12-18
Payer: COMMERCIAL

## 2018-12-18 DIAGNOSIS — S42.292D CLOSED FRACTURE OF HEAD OF LEFT HUMERUS WITH ROUTINE HEALING, SUBSEQUENT ENCOUNTER: ICD-10-CM

## 2018-12-18 DIAGNOSIS — S42.292A OTHER CLOSED DISPLACED FRACTURE OF PROXIMAL END OF LEFT HUMERUS, INITIAL ENCOUNTER: Primary | ICD-10-CM

## 2018-12-18 PROCEDURE — 97110 THERAPEUTIC EXERCISES: CPT

## 2018-12-20 ENCOUNTER — TREATMENT (OUTPATIENT)
Dept: PHYSICAL THERAPY | Age: 63
End: 2018-12-20
Payer: COMMERCIAL

## 2018-12-20 DIAGNOSIS — S42.292A OTHER CLOSED DISPLACED FRACTURE OF PROXIMAL END OF LEFT HUMERUS, INITIAL ENCOUNTER: Primary | ICD-10-CM

## 2018-12-20 PROCEDURE — 97110 THERAPEUTIC EXERCISES: CPT

## 2018-12-20 NOTE — PROGRESS NOTES
Physical Therapy Daily Treatment Note    Date: 2018  Patient Name: Leslie Marroquin  : 1955   MRN: [id number]  DOInjury: 18  DOSx: none   Referring Provider:     Medical Diagnosis:   Luis M Lemus, [address], [place]        G21.456E (ICD-10-CM) - Other closed displaced fracture of proximal end of left humerus, initial encounter    Outcome Measure:     S: Pt c/o increased soreness in LUE from wrapping gifts the past 2 days. Pt reported tightness and pain with movements of abduction. O:  Gentle ROM  Time 6017-9049 am     Visit 17     Pain 5/10     ROM Left Shoulder:  AROM: 83° Forward elevation, 30° ER in 60 ABD in scapular plane, IR to L5 w/ marked pain  PROM: 110° Forward elevation, 60° ER in scapular plane , 45° IR     Modalities      Ice 15 min     Manual                  Stretch      Table slides      Wall Flexion      Wall ER stretch      Towel IR stretch      IR reaching behind back      Exercise      Shrugs AROM      Pendulum Ex      UBE 2/2     Pulleys - flex 5 minutes     Pulleys-IR 3 min     Supine wand chest press 2 x 20 Blk wand    Supine wand flex 2 x 20    \"    Supine wand ER/IR 2 x 20    \"    Supine flex 2 x 20    \"    Standing wand IR across back 2 x 20    \"    S-lying flex 2 x 20     S-lying ER 2 x 20     Sidelying Abd 2 x 20 New    Standing wand chest press 2 x 10 Cicuit    Standing 1 arm press 2 X 10    \"    Standing flexion      Standing ABD      Finger ladder 5x    L13     ROWS:  H Green 2 x 20     ROWS: M Green 2 x 20 \"    ROWS: L  \"    ER  \"    IR  \"          Other           A:  Tolerated treatment well.    P: Continue with rehab plan  Sharron Rogers PTA    Treatment Charges: Mins Units   Initial Evaluation     Re-Evaluation     Ther Exercise         TE 60 4   Manual Therapy     MT     Ther Activities        TA     Gait Training          GT     Neuro Re-education NR     Modalities     Non-Billable Service Time 15 ice 0   Other     Total

## 2018-12-27 ENCOUNTER — TREATMENT (OUTPATIENT)
Dept: PHYSICAL THERAPY | Age: 63
End: 2018-12-27
Payer: COMMERCIAL

## 2018-12-27 DIAGNOSIS — S42.292A OTHER CLOSED DISPLACED FRACTURE OF PROXIMAL END OF LEFT HUMERUS, INITIAL ENCOUNTER: Primary | ICD-10-CM

## 2018-12-27 PROCEDURE — 97110 THERAPEUTIC EXERCISES: CPT

## 2019-01-02 ENCOUNTER — TREATMENT (OUTPATIENT)
Dept: PHYSICAL THERAPY | Age: 64
End: 2019-01-02
Payer: COMMERCIAL

## 2019-01-02 DIAGNOSIS — S42.292A OTHER CLOSED DISPLACED FRACTURE OF PROXIMAL END OF LEFT HUMERUS, INITIAL ENCOUNTER: Primary | ICD-10-CM

## 2019-01-02 PROCEDURE — 97110 THERAPEUTIC EXERCISES: CPT

## 2019-01-03 ENCOUNTER — TREATMENT (OUTPATIENT)
Dept: PHYSICAL THERAPY | Age: 64
End: 2019-01-03
Payer: COMMERCIAL

## 2019-01-03 DIAGNOSIS — S42.292A OTHER CLOSED DISPLACED FRACTURE OF PROXIMAL END OF LEFT HUMERUS, INITIAL ENCOUNTER: Primary | ICD-10-CM

## 2019-01-03 PROCEDURE — 97110 THERAPEUTIC EXERCISES: CPT | Performed by: PHYSICAL THERAPIST

## 2019-01-07 ENCOUNTER — TREATMENT (OUTPATIENT)
Dept: PHYSICAL THERAPY | Age: 64
End: 2019-01-07
Payer: COMMERCIAL

## 2019-01-07 ENCOUNTER — OFFICE VISIT (OUTPATIENT)
Dept: ORTHOPEDIC SURGERY | Age: 64
End: 2019-01-07
Payer: COMMERCIAL

## 2019-01-07 VITALS — HEIGHT: 63 IN | WEIGHT: 218 LBS | TEMPERATURE: 98 F | BODY MASS INDEX: 38.62 KG/M2

## 2019-01-07 DIAGNOSIS — S42.202A CLOSED FRACTURE OF PROXIMAL END OF LEFT HUMERUS, UNSPECIFIED FRACTURE MORPHOLOGY, INITIAL ENCOUNTER: Primary | ICD-10-CM

## 2019-01-07 DIAGNOSIS — S42.292A OTHER CLOSED DISPLACED FRACTURE OF PROXIMAL END OF LEFT HUMERUS, INITIAL ENCOUNTER: Primary | ICD-10-CM

## 2019-01-07 PROCEDURE — 99213 OFFICE O/P EST LOW 20 MIN: CPT | Performed by: ORTHOPAEDIC SURGERY

## 2019-01-07 PROCEDURE — 97110 THERAPEUTIC EXERCISES: CPT

## 2019-01-10 ENCOUNTER — TREATMENT (OUTPATIENT)
Dept: PHYSICAL THERAPY | Age: 64
End: 2019-01-10
Payer: COMMERCIAL

## 2019-01-10 DIAGNOSIS — S42.292A OTHER CLOSED DISPLACED FRACTURE OF PROXIMAL END OF LEFT HUMERUS, INITIAL ENCOUNTER: Primary | ICD-10-CM

## 2019-01-10 PROCEDURE — 97110 THERAPEUTIC EXERCISES: CPT

## 2019-01-15 ENCOUNTER — TREATMENT (OUTPATIENT)
Dept: PHYSICAL THERAPY | Age: 64
End: 2019-01-15
Payer: COMMERCIAL

## 2019-01-15 DIAGNOSIS — S42.292A OTHER CLOSED DISPLACED FRACTURE OF PROXIMAL END OF LEFT HUMERUS, INITIAL ENCOUNTER: Primary | ICD-10-CM

## 2019-01-15 PROCEDURE — 97110 THERAPEUTIC EXERCISES: CPT

## 2019-01-17 ENCOUNTER — TREATMENT (OUTPATIENT)
Dept: PHYSICAL THERAPY | Age: 64
End: 2019-01-17
Payer: COMMERCIAL

## 2019-01-17 DIAGNOSIS — S42.292A OTHER CLOSED DISPLACED FRACTURE OF PROXIMAL END OF LEFT HUMERUS, INITIAL ENCOUNTER: Primary | ICD-10-CM

## 2019-01-17 PROCEDURE — 97110 THERAPEUTIC EXERCISES: CPT | Performed by: PHYSICAL THERAPIST

## 2019-01-17 PROCEDURE — G8984 CARRY CURRENT STATUS: HCPCS | Performed by: PHYSICAL THERAPIST

## 2019-01-17 PROCEDURE — G8985 CARRY GOAL STATUS: HCPCS | Performed by: PHYSICAL THERAPIST

## 2019-01-17 PROCEDURE — G8986 CARRY D/C STATUS: HCPCS | Performed by: PHYSICAL THERAPIST

## 2019-02-22 ENCOUNTER — HOSPITAL ENCOUNTER (OUTPATIENT)
Age: 64
Discharge: HOME | End: 2019-02-24
Payer: COMMERCIAL

## 2019-02-22 LAB
ALBUMIN SERPL-MCNC: 4.3 G/DL (ref 3.5–5.2)
ALP BLD-CCNC: 163 U/L (ref 35–104)
ALT SERPL-CCNC: 46 U/L (ref 0–32)
ANION GAP SERPL CALCULATED.3IONS-SCNC: 10 MMOL/L (ref 7–16)
AST SERPL-CCNC: 38 U/L (ref 0–31)
BILIRUB SERPL-MCNC: 0.4 MG/DL (ref 0–1.2)
BILIRUBIN DIRECT: <0.2 MG/DL (ref 0–0.3)
BILIRUBIN, INDIRECT: ABNORMAL MG/DL (ref 0–1)
BUN BLDV-MCNC: 14 MG/DL (ref 8–23)
CALCIUM SERPL-MCNC: 10 MG/DL (ref 8.6–10.2)
CHLORIDE BLD-SCNC: 105 MMOL/L (ref 98–107)
CO2: 26 MMOL/L (ref 22–29)
CREAT SERPL-MCNC: 0.7 MG/DL (ref 0.5–1)
GFR AFRICAN AMERICAN: >60
GFR NON-AFRICAN AMERICAN: >60 ML/MIN/1.73
GLUCOSE BLD-MCNC: 107 MG/DL (ref 74–99)
PHOSPHORUS: 2.7 MG/DL (ref 2.5–4.5)
POTASSIUM SERPL-SCNC: 3.9 MMOL/L (ref 3.5–5)
SODIUM BLD-SCNC: 141 MMOL/L (ref 132–146)
TOTAL PROTEIN: 7.3 G/DL (ref 6.4–8.3)
TSH SERPL DL<=0.05 MIU/L-ACNC: 0.94 UIU/ML (ref 0.27–4.2)

## 2019-02-22 PROCEDURE — 84450 TRANSFERASE (AST) (SGOT): CPT

## 2019-02-22 PROCEDURE — 36415 COLL VENOUS BLD VENIPUNCTURE: CPT

## 2019-02-22 PROCEDURE — 84155 ASSAY OF PROTEIN SERUM: CPT

## 2019-02-22 PROCEDURE — 82247 BILIRUBIN TOTAL: CPT

## 2019-02-22 PROCEDURE — 84075 ASSAY ALKALINE PHOSPHATASE: CPT

## 2019-02-22 PROCEDURE — 84443 ASSAY THYROID STIM HORMONE: CPT

## 2019-02-22 PROCEDURE — 80069 RENAL FUNCTION PANEL: CPT

## 2019-02-22 PROCEDURE — 82248 BILIRUBIN DIRECT: CPT

## 2019-02-22 PROCEDURE — 84460 ALANINE AMINO (ALT) (SGPT): CPT

## 2019-07-29 ENCOUNTER — HOSPITAL ENCOUNTER (OUTPATIENT)
Age: 64
Discharge: HOME | End: 2019-07-31
Payer: COMMERCIAL

## 2019-07-29 LAB
ALBUMIN SERPL-MCNC: 4.6 G/DL (ref 3.5–5.2)
ALP BLD-CCNC: 173 U/L (ref 35–104)
ALT SERPL-CCNC: 63 U/L (ref 0–32)
ANION GAP SERPL CALCULATED.3IONS-SCNC: 20 MMOL/L (ref 7–16)
AST SERPL-CCNC: 70 U/L (ref 0–31)
BILIRUB SERPL-MCNC: 0.4 MG/DL (ref 0–1.2)
BILIRUBIN DIRECT: <0.2 MG/DL (ref 0–0.3)
BILIRUBIN, INDIRECT: ABNORMAL MG/DL (ref 0–1)
BUN BLDV-MCNC: 11 MG/DL (ref 8–23)
CALCIUM SERPL-MCNC: 10.8 MG/DL (ref 8.6–10.2)
CHLORIDE BLD-SCNC: 109 MMOL/L (ref 98–107)
CO2: 20 MMOL/L (ref 22–29)
CREAT SERPL-MCNC: 0.7 MG/DL (ref 0.5–1)
GFR AFRICAN AMERICAN: >60
GFR NON-AFRICAN AMERICAN: >60 ML/MIN/1.73
GLUCOSE BLD-MCNC: 130 MG/DL (ref 74–99)
PHOSPHORUS: 2.3 MG/DL (ref 2.5–4.5)
POTASSIUM SERPL-SCNC: 4.4 MMOL/L (ref 3.5–5)
SODIUM BLD-SCNC: 149 MMOL/L (ref 132–146)
TOTAL PROTEIN: 7.7 G/DL (ref 6.4–8.3)
TSH SERPL DL<=0.05 MIU/L-ACNC: 0.34 UIU/ML (ref 0.27–4.2)

## 2019-07-29 PROCEDURE — 36415 COLL VENOUS BLD VENIPUNCTURE: CPT

## 2019-07-29 PROCEDURE — 84460 ALANINE AMINO (ALT) (SGPT): CPT

## 2019-07-29 PROCEDURE — 82247 BILIRUBIN TOTAL: CPT

## 2019-07-29 PROCEDURE — 82248 BILIRUBIN DIRECT: CPT

## 2019-07-29 PROCEDURE — 84155 ASSAY OF PROTEIN SERUM: CPT

## 2019-07-29 PROCEDURE — 84075 ASSAY ALKALINE PHOSPHATASE: CPT

## 2019-07-29 PROCEDURE — 84443 ASSAY THYROID STIM HORMONE: CPT

## 2019-07-29 PROCEDURE — 80069 RENAL FUNCTION PANEL: CPT

## 2019-07-29 PROCEDURE — 84450 TRANSFERASE (AST) (SGOT): CPT

## 2019-08-15 ENCOUNTER — HOSPITAL ENCOUNTER (OUTPATIENT)
Age: 64
Discharge: HOME | End: 2019-08-17
Payer: COMMERCIAL

## 2019-08-15 LAB
ALBUMIN SERPL-MCNC: 4.3 G/DL (ref 3.5–5.2)
ALP BLD-CCNC: 167 U/L (ref 35–104)
ALT SERPL-CCNC: 59 U/L (ref 0–32)
ANION GAP SERPL CALCULATED.3IONS-SCNC: 14 MMOL/L (ref 7–16)
AST SERPL-CCNC: 56 U/L (ref 0–31)
BILIRUB SERPL-MCNC: 0.5 MG/DL (ref 0–1.2)
BUN BLDV-MCNC: 10 MG/DL (ref 8–23)
CALCIUM SERPL-MCNC: 10.6 MG/DL (ref 8.6–10.2)
CHLORIDE BLD-SCNC: 104 MMOL/L (ref 98–107)
CO2: 24 MMOL/L (ref 22–29)
CREAT SERPL-MCNC: 0.7 MG/DL (ref 0.5–1)
FOLLICLE STIMULATING HORMONE: 45.8 MIU/ML
GFR AFRICAN AMERICAN: >60
GFR NON-AFRICAN AMERICAN: >60 ML/MIN/1.73
GLUCOSE BLD-MCNC: 141 MG/DL (ref 74–99)
LUTEINIZING HORMONE: 25.6 MIU/ML
POTASSIUM SERPL-SCNC: 4.3 MMOL/L (ref 3.5–5)
SODIUM BLD-SCNC: 142 MMOL/L (ref 132–146)
TOTAL PROTEIN: 7.5 G/DL (ref 6.4–8.3)
TSH SERPL DL<=0.05 MIU/L-ACNC: 0.74 UIU/ML (ref 0.27–4.2)

## 2019-08-15 PROCEDURE — 83498 ASY HYDROXYPROGESTERONE 17-D: CPT

## 2019-08-15 PROCEDURE — 83002 ASSAY OF GONADOTROPIN (LH): CPT

## 2019-08-15 PROCEDURE — 82671 ASSAY OF ESTROGENS: CPT

## 2019-08-15 PROCEDURE — 84443 ASSAY THYROID STIM HORMONE: CPT

## 2019-08-15 PROCEDURE — 36415 COLL VENOUS BLD VENIPUNCTURE: CPT

## 2019-08-15 PROCEDURE — 80053 COMPREHEN METABOLIC PANEL: CPT

## 2019-08-15 PROCEDURE — 83001 ASSAY OF GONADOTROPIN (FSH): CPT

## 2019-08-19 LAB
17-OH PROGESTERONE LCMS: 13.34 NG/DL
ESTRADIOL LEVEL: 12.1 PG/ML
ESTROGEN TOTAL: 36.4 PG/ML
ESTRONE: 24.3 PG/ML

## 2019-09-26 ENCOUNTER — HOSPITAL ENCOUNTER (OUTPATIENT)
Age: 64
Discharge: HOME | End: 2019-09-28
Payer: MEDICARE

## 2019-09-26 LAB
ALBUMIN SERPL-MCNC: 4.2 G/DL (ref 3.5–5.2)
ALP BLD-CCNC: 150 U/L (ref 35–104)
ALT SERPL-CCNC: 69 U/L (ref 0–32)
ANION GAP SERPL CALCULATED.3IONS-SCNC: 13 MMOL/L (ref 7–16)
AST SERPL-CCNC: 70 U/L (ref 0–31)
BILIRUB SERPL-MCNC: 0.4 MG/DL (ref 0–1.2)
BUN BLDV-MCNC: 12 MG/DL (ref 8–23)
CALCIUM SERPL-MCNC: 10.5 MG/DL (ref 8.6–10.2)
CHLORIDE BLD-SCNC: 103 MMOL/L (ref 98–107)
CO2: 24 MMOL/L (ref 22–29)
CREAT SERPL-MCNC: 0.7 MG/DL (ref 0.5–1)
GFR AFRICAN AMERICAN: >60
GFR NON-AFRICAN AMERICAN: >60 ML/MIN/1.73
GLUCOSE BLD-MCNC: 110 MG/DL (ref 74–99)
HBA1C MFR BLD: 5.6 % (ref 4–5.6)
POTASSIUM SERPL-SCNC: 4.3 MMOL/L (ref 3.5–5)
SODIUM BLD-SCNC: 140 MMOL/L (ref 132–146)
TOTAL PROTEIN: 7.6 G/DL (ref 6.4–8.3)
TSH SERPL DL<=0.05 MIU/L-ACNC: 0.32 UIU/ML (ref 0.27–4.2)

## 2019-09-26 PROCEDURE — 36415 COLL VENOUS BLD VENIPUNCTURE: CPT

## 2019-09-26 PROCEDURE — 84443 ASSAY THYROID STIM HORMONE: CPT

## 2019-09-26 PROCEDURE — 80053 COMPREHEN METABOLIC PANEL: CPT

## 2019-09-26 PROCEDURE — 83036 HEMOGLOBIN GLYCOSYLATED A1C: CPT

## 2020-02-18 ENCOUNTER — HOSPITAL ENCOUNTER (OUTPATIENT)
Dept: MAMMOGRAPHY | Age: 65
Discharge: HOME | End: 2020-02-20
Payer: COMMERCIAL

## 2020-02-18 PROCEDURE — 77063 BREAST TOMOSYNTHESIS BI: CPT

## 2020-05-26 ENCOUNTER — HOSPITAL ENCOUNTER (OUTPATIENT)
Dept: GENERAL RADIOLOGY | Age: 65
Discharge: HOME | End: 2020-05-28
Payer: MEDICARE

## 2020-05-26 ENCOUNTER — HOSPITAL ENCOUNTER (OUTPATIENT)
Dept: GENERAL RADIOLOGY | Age: 65
End: 2020-05-26
Payer: MEDICARE

## 2020-05-26 PROCEDURE — 74246 X-RAY XM UPR GI TRC 2CNTRST: CPT

## 2020-05-26 PROCEDURE — 2500000003 HC RX 250 WO HCPCS: Performed by: FAMILY MEDICINE

## 2020-05-26 RX ADMIN — BARIUM SULFATE 340 G: 980 POWDER, FOR SUSPENSION ORAL at 08:47

## 2020-05-26 RX ADMIN — BARIUM SULFATE 176 G: 960 POWDER, FOR SUSPENSION ORAL at 08:48

## 2021-10-14 ENCOUNTER — HOSPITAL ENCOUNTER (OUTPATIENT)
Dept: MAMMOGRAPHY | Age: 66
Discharge: HOME | End: 2021-10-16
Payer: MEDICARE

## 2021-10-14 DIAGNOSIS — Z12.31 OTHER SCREENING MAMMOGRAM: ICD-10-CM

## 2021-10-14 PROCEDURE — 77063 BREAST TOMOSYNTHESIS BI: CPT

## 2023-11-07 ENCOUNTER — HOSPITAL ENCOUNTER (OUTPATIENT)
Dept: GENERAL RADIOLOGY | Age: 68
Discharge: HOME | End: 2023-11-09
Payer: MEDICARE

## 2023-11-07 VITALS — WEIGHT: 211 LBS | HEIGHT: 63 IN | BODY MASS INDEX: 37.39 KG/M2

## 2023-11-07 DIAGNOSIS — Z12.31 ENCOUNTER FOR SCREENING MAMMOGRAM FOR BREAST CANCER: ICD-10-CM

## 2023-11-07 PROCEDURE — 77067 SCR MAMMO BI INCL CAD: CPT

## 2025-08-11 ENCOUNTER — HOSPITAL ENCOUNTER (EMERGENCY)
Age: 70
Discharge: HOME | End: 2025-08-11
Attending: EMERGENCY MEDICINE
Payer: MEDICARE

## 2025-08-11 ENCOUNTER — APPOINTMENT (OUTPATIENT)
Dept: GENERAL RADIOLOGY | Age: 70
End: 2025-08-11
Payer: MEDICARE

## 2025-08-11 ENCOUNTER — APPOINTMENT (OUTPATIENT)
Dept: CT IMAGING | Age: 70
End: 2025-08-11
Payer: MEDICARE

## 2025-08-11 VITALS
SYSTOLIC BLOOD PRESSURE: 153 MMHG | WEIGHT: 200 LBS | RESPIRATION RATE: 20 BRPM | DIASTOLIC BLOOD PRESSURE: 77 MMHG | HEIGHT: 63 IN | BODY MASS INDEX: 35.44 KG/M2 | OXYGEN SATURATION: 96 % | HEART RATE: 68 BPM | TEMPERATURE: 98.5 F

## 2025-08-11 DIAGNOSIS — S52.601A CLOSED FRACTURE OF DISTAL END OF RIGHT ULNA, UNSPECIFIED FRACTURE MORPHOLOGY, INITIAL ENCOUNTER: ICD-10-CM

## 2025-08-11 DIAGNOSIS — S52.501A CLOSED FRACTURE OF DISTAL END OF RIGHT RADIUS, UNSPECIFIED FRACTURE MORPHOLOGY, INITIAL ENCOUNTER: Primary | ICD-10-CM

## 2025-08-11 DIAGNOSIS — S62.001A CLOSED NONDISPLACED FRACTURE OF SCAPHOID OF RIGHT WRIST, UNSPECIFIED PORTION OF SCAPHOID, INITIAL ENCOUNTER: ICD-10-CM

## 2025-08-11 PROCEDURE — 29125 APPL SHORT ARM SPLINT STATIC: CPT

## 2025-08-11 PROCEDURE — 6370000000 HC RX 637 (ALT 250 FOR IP)

## 2025-08-11 PROCEDURE — 70450 CT HEAD/BRAIN W/O DYE: CPT

## 2025-08-11 PROCEDURE — 99284 EMERGENCY DEPT VISIT MOD MDM: CPT

## 2025-08-11 PROCEDURE — 73110 X-RAY EXAM OF WRIST: CPT

## 2025-08-11 RX ORDER — HYDROCODONE BITARTRATE AND ACETAMINOPHEN 5; 325 MG/1; MG/1
1 TABLET ORAL ONCE
Status: COMPLETED | OUTPATIENT
Start: 2025-08-11 | End: 2025-08-11

## 2025-08-11 RX ORDER — HYDROCODONE BITARTRATE AND ACETAMINOPHEN 5; 325 MG/1; MG/1
1 TABLET ORAL EVERY 6 HOURS PRN
Qty: 12 TABLET | Refills: 0 | Status: SHIPPED | OUTPATIENT
Start: 2025-08-11 | End: 2025-08-14

## 2025-08-11 RX ORDER — BACITRACIN ZINC 500 [USP'U]/G
OINTMENT TOPICAL ONCE
Status: COMPLETED | OUTPATIENT
Start: 2025-08-11 | End: 2025-08-11

## 2025-08-11 RX ORDER — GINSENG 100 MG
CAPSULE ORAL
Status: COMPLETED
Start: 2025-08-11 | End: 2025-08-11

## 2025-08-11 RX ORDER — LIDOCAINE HYDROCHLORIDE 10 MG/ML
5 INJECTION, SOLUTION INFILTRATION; PERINEURAL ONCE
Status: DISCONTINUED | OUTPATIENT
Start: 2025-08-11 | End: 2025-08-12 | Stop reason: HOSPADM

## 2025-08-11 RX ADMIN — BACITRACIN ZINC: 500 OINTMENT TOPICAL at 23:24

## 2025-08-11 RX ADMIN — HYDROCODONE BITARTRATE AND ACETAMINOPHEN 1 TABLET: 5; 325 TABLET ORAL at 19:17

## 2025-08-11 RX ADMIN — BACITRACIN: 500 OINTMENT TOPICAL at 23:24

## 2025-08-11 ASSESSMENT — LIFESTYLE VARIABLES
HOW OFTEN DO YOU HAVE A DRINK CONTAINING ALCOHOL: NEVER
HOW MANY STANDARD DRINKS CONTAINING ALCOHOL DO YOU HAVE ON A TYPICAL DAY: PATIENT DOES NOT DRINK

## 2025-08-11 ASSESSMENT — PAIN SCALES - GENERAL: PAINLEVEL_OUTOF10: 8

## 2025-08-11 ASSESSMENT — PAIN - FUNCTIONAL ASSESSMENT: PAIN_FUNCTIONAL_ASSESSMENT: 0-10

## 2025-08-18 ENCOUNTER — OFFICE VISIT (OUTPATIENT)
Dept: ORTHOPEDIC SURGERY | Age: 70
End: 2025-08-18
Payer: MEDICARE

## 2025-08-18 VITALS — WEIGHT: 200 LBS | HEIGHT: 63 IN | BODY MASS INDEX: 35.44 KG/M2

## 2025-08-18 DIAGNOSIS — S52.501A CLOSED FRACTURE OF DISTAL END OF RIGHT RADIUS, UNSPECIFIED FRACTURE MORPHOLOGY, INITIAL ENCOUNTER: Primary | ICD-10-CM

## 2025-08-18 PROBLEM — S52.601A CLOSED FRACTURE OF RIGHT DISTAL RADIUS AND ULNA: Status: ACTIVE | Noted: 2025-08-18

## 2025-08-18 PROCEDURE — 1125F AMNT PAIN NOTED PAIN PRSNT: CPT | Performed by: ORTHOPAEDIC SURGERY

## 2025-08-18 PROCEDURE — 1123F ACP DISCUSS/DSCN MKR DOCD: CPT | Performed by: ORTHOPAEDIC SURGERY

## 2025-08-18 PROCEDURE — 99214 OFFICE O/P EST MOD 30 MIN: CPT | Performed by: ORTHOPAEDIC SURGERY

## 2025-08-18 RX ORDER — PAROXETINE 20 MG/1
20 TABLET, FILM COATED ORAL EVERY MORNING
COMMUNITY

## 2025-08-18 RX ORDER — LOSARTAN POTASSIUM 50 MG/1
50 TABLET ORAL DAILY
COMMUNITY

## 2025-08-18 RX ORDER — CARVEDILOL 3.12 MG/1
3.12 TABLET ORAL 2 TIMES DAILY WITH MEALS
COMMUNITY

## 2025-08-18 RX ORDER — PANTOPRAZOLE SODIUM 40 MG/1
40 TABLET, DELAYED RELEASE ORAL DAILY
COMMUNITY

## 2025-08-22 ENCOUNTER — HOSPITAL ENCOUNTER (OUTPATIENT)
Dept: OPERATING ROOM | Age: 70
Setting detail: OUTPATIENT SURGERY
Discharge: HOME | End: 2025-08-22
Attending: ORTHOPAEDIC SURGERY
Payer: MEDICARE

## 2025-08-22 ENCOUNTER — ANESTHESIA EVENT (OUTPATIENT)
Dept: OPERATING ROOM | Age: 70
End: 2025-08-22
Payer: MEDICARE

## 2025-08-22 ENCOUNTER — HOSPITAL ENCOUNTER (OUTPATIENT)
Age: 70
Setting detail: OUTPATIENT SURGERY
Discharge: HOME | End: 2025-08-22
Attending: ORTHOPAEDIC SURGERY | Admitting: ORTHOPAEDIC SURGERY
Payer: MEDICARE

## 2025-08-22 ENCOUNTER — ANESTHESIA (OUTPATIENT)
Dept: OPERATING ROOM | Age: 70
End: 2025-08-22
Payer: MEDICARE

## 2025-08-22 VITALS
SYSTOLIC BLOOD PRESSURE: 139 MMHG | WEIGHT: 201 LBS | DIASTOLIC BLOOD PRESSURE: 60 MMHG | HEART RATE: 89 BPM | OXYGEN SATURATION: 93 % | BODY MASS INDEX: 35.61 KG/M2 | RESPIRATION RATE: 16 BRPM | HEIGHT: 63 IN | TEMPERATURE: 98.2 F

## 2025-08-22 DIAGNOSIS — S52.501A CLOSED FRACTURE OF DISTAL ENDS OF RIGHT RADIUS AND ULNA, INITIAL ENCOUNTER: ICD-10-CM

## 2025-08-22 DIAGNOSIS — S52.601A CLOSED FRACTURE OF DISTAL ENDS OF RIGHT RADIUS AND ULNA, INITIAL ENCOUNTER: ICD-10-CM

## 2025-08-22 DIAGNOSIS — S42.202A CLOSED FRACTURE OF PROXIMAL END OF LEFT HUMERUS, UNSPECIFIED FRACTURE MORPHOLOGY, INITIAL ENCOUNTER: Primary | ICD-10-CM

## 2025-08-22 PROCEDURE — 6360000002 HC RX W HCPCS

## 2025-08-22 PROCEDURE — 3600000012 HC SURGERY LEVEL 2 ADDTL 15MIN: Performed by: ORTHOPAEDIC SURGERY

## 2025-08-22 PROCEDURE — 7100000000 HC PACU RECOVERY - FIRST 15 MIN: Performed by: ORTHOPAEDIC SURGERY

## 2025-08-22 PROCEDURE — 6370000000 HC RX 637 (ALT 250 FOR IP): Performed by: ANESTHESIOLOGY

## 2025-08-22 PROCEDURE — 6360000002 HC RX W HCPCS: Performed by: ORTHOPAEDIC SURGERY

## 2025-08-22 PROCEDURE — 2500000003 HC RX 250 WO HCPCS

## 2025-08-22 PROCEDURE — 3700000000 HC ANESTHESIA ATTENDED CARE: Performed by: ORTHOPAEDIC SURGERY

## 2025-08-22 PROCEDURE — 6360000002 HC RX W HCPCS: Performed by: NURSE ANESTHETIST, CERTIFIED REGISTERED

## 2025-08-22 PROCEDURE — 7100000010 HC PHASE II RECOVERY - FIRST 15 MIN: Performed by: ORTHOPAEDIC SURGERY

## 2025-08-22 PROCEDURE — 7100000001 HC PACU RECOVERY - ADDTL 15 MIN: Performed by: ORTHOPAEDIC SURGERY

## 2025-08-22 PROCEDURE — 3600000002 HC SURGERY LEVEL 2 BASE: Performed by: ORTHOPAEDIC SURGERY

## 2025-08-22 PROCEDURE — C1713 ANCHOR/SCREW BN/BN,TIS/BN: HCPCS | Performed by: ORTHOPAEDIC SURGERY

## 2025-08-22 PROCEDURE — C1769 GUIDE WIRE: HCPCS | Performed by: ORTHOPAEDIC SURGERY

## 2025-08-22 PROCEDURE — 2580000003 HC RX 258: Performed by: ANESTHESIOLOGY

## 2025-08-22 PROCEDURE — 2709999900 HC NON-CHARGEABLE SUPPLY: Performed by: ORTHOPAEDIC SURGERY

## 2025-08-22 PROCEDURE — 2500000003 HC RX 250 WO HCPCS: Performed by: NURSE ANESTHETIST, CERTIFIED REGISTERED

## 2025-08-22 PROCEDURE — 7100000011 HC PHASE II RECOVERY - ADDTL 15 MIN: Performed by: ORTHOPAEDIC SURGERY

## 2025-08-22 PROCEDURE — 25609 OPTX DST RD XART FX/EP SEP3+: CPT | Performed by: ORTHOPAEDIC SURGERY

## 2025-08-22 PROCEDURE — 3700000001 HC ADD 15 MINUTES (ANESTHESIA): Performed by: ORTHOPAEDIC SURGERY

## 2025-08-22 DEVICE — IMPLANTABLE DEVICE: Type: IMPLANTABLE DEVICE | Site: WRIST | Status: FUNCTIONAL

## 2025-08-22 DEVICE — SCREW BNE L16MM DIA3.5MM ANK TI LO PROF: Type: IMPLANTABLE DEVICE | Site: WRIST | Status: FUNCTIONAL

## 2025-08-22 DEVICE — PLATE BNE 3 H NAR R DST VOLAR RAD TI LO PROF: Type: IMPLANTABLE DEVICE | Site: WRIST | Status: FUNCTIONAL

## 2025-08-22 DEVICE — SCREW BNE L14MM DIA3.5MM CORT TI ST NONLOCKING HD LO PROF: Type: IMPLANTABLE DEVICE | Site: WRIST | Status: FUNCTIONAL

## 2025-08-22 DEVICE — VAL KREULOCK SCREW TI 2.4X20: Type: IMPLANTABLE DEVICE | Site: WRIST | Status: FUNCTIONAL

## 2025-08-22 RX ORDER — DIPHENHYDRAMINE HYDROCHLORIDE 50 MG/ML
INJECTION, SOLUTION INTRAMUSCULAR; INTRAVENOUS
Status: DISCONTINUED | OUTPATIENT
Start: 2025-08-22 | End: 2025-08-22 | Stop reason: SDUPTHER

## 2025-08-22 RX ORDER — DEXAMETHASONE SODIUM PHOSPHATE 10 MG/ML
INJECTION, SOLUTION INTRAMUSCULAR; INTRAVENOUS
Status: DISCONTINUED | OUTPATIENT
Start: 2025-08-22 | End: 2025-08-22 | Stop reason: SDUPTHER

## 2025-08-22 RX ORDER — PROPOFOL 10 MG/ML
INJECTION, EMULSION INTRAVENOUS
Status: DISCONTINUED | OUTPATIENT
Start: 2025-08-22 | End: 2025-08-22 | Stop reason: SDUPTHER

## 2025-08-22 RX ORDER — MIDAZOLAM HYDROCHLORIDE 1 MG/ML
INJECTION, SOLUTION INTRAMUSCULAR; INTRAVENOUS
Status: DISCONTINUED | OUTPATIENT
Start: 2025-08-22 | End: 2025-08-22 | Stop reason: SDUPTHER

## 2025-08-22 RX ORDER — KETOROLAC TROMETHAMINE 30 MG/ML
INJECTION, SOLUTION INTRAMUSCULAR; INTRAVENOUS
Status: DISCONTINUED | OUTPATIENT
Start: 2025-08-22 | End: 2025-08-22 | Stop reason: SDUPTHER

## 2025-08-22 RX ORDER — ONDANSETRON 2 MG/ML
INJECTION INTRAMUSCULAR; INTRAVENOUS
Status: DISCONTINUED | OUTPATIENT
Start: 2025-08-22 | End: 2025-08-22 | Stop reason: SDUPTHER

## 2025-08-22 RX ORDER — HYDROCODONE BITARTRATE AND ACETAMINOPHEN 5; 325 MG/1; MG/1
1 TABLET ORAL EVERY 6 HOURS PRN
Qty: 28 TABLET | Refills: 0 | Status: SHIPPED | OUTPATIENT
Start: 2025-08-22 | End: 2025-08-29

## 2025-08-22 RX ORDER — PHENYLEPHRINE HYDROCHLORIDE 10 MG/ML
INJECTION INTRAVENOUS
Status: DISCONTINUED | OUTPATIENT
Start: 2025-08-22 | End: 2025-08-22 | Stop reason: SDUPTHER

## 2025-08-22 RX ORDER — FENTANYL CITRATE 50 UG/ML
INJECTION, SOLUTION INTRAMUSCULAR; INTRAVENOUS
Status: DISCONTINUED | OUTPATIENT
Start: 2025-08-22 | End: 2025-08-22 | Stop reason: SDUPTHER

## 2025-08-22 RX ORDER — SODIUM CHLORIDE 9 MG/ML
INJECTION, SOLUTION INTRAMUSCULAR; INTRAVENOUS; SUBCUTANEOUS
Status: DISCONTINUED | OUTPATIENT
Start: 2025-08-22 | End: 2025-08-22 | Stop reason: SDUPTHER

## 2025-08-22 RX ORDER — BUPIVACAINE HYDROCHLORIDE 2.5 MG/ML
INJECTION, SOLUTION EPIDURAL; INFILTRATION; INTRACAUDAL; PERINEURAL PRN
Status: DISCONTINUED | OUTPATIENT
Start: 2025-08-22 | End: 2025-08-22 | Stop reason: ALTCHOICE

## 2025-08-22 RX ORDER — HYDROCODONE BITARTRATE AND ACETAMINOPHEN 5; 325 MG/1; MG/1
1 TABLET ORAL PRN
Status: COMPLETED | OUTPATIENT
Start: 2025-08-22 | End: 2025-08-22

## 2025-08-22 RX ORDER — GLYCOPYRROLATE 0.2 MG/ML
INJECTION INTRAMUSCULAR; INTRAVENOUS
Status: DISCONTINUED | OUTPATIENT
Start: 2025-08-22 | End: 2025-08-22 | Stop reason: SDUPTHER

## 2025-08-22 RX ORDER — SODIUM CHLORIDE, SODIUM LACTATE, POTASSIUM CHLORIDE, CALCIUM CHLORIDE 600; 310; 30; 20 MG/100ML; MG/100ML; MG/100ML; MG/100ML
INJECTION, SOLUTION INTRAVENOUS CONTINUOUS
Status: DISCONTINUED | OUTPATIENT
Start: 2025-08-22 | End: 2025-08-22 | Stop reason: HOSPADM

## 2025-08-22 RX ORDER — LIDOCAINE HYDROCHLORIDE 20 MG/ML
INJECTION, SOLUTION INTRAVENOUS
Status: DISCONTINUED | OUTPATIENT
Start: 2025-08-22 | End: 2025-08-22 | Stop reason: SDUPTHER

## 2025-08-22 RX ORDER — CEPHALEXIN 500 MG/1
500 CAPSULE ORAL 3 TIMES DAILY
Qty: 21 CAPSULE | Refills: 0 | Status: SHIPPED | OUTPATIENT
Start: 2025-08-22 | End: 2025-08-29

## 2025-08-22 RX ADMIN — SODIUM CHLORIDE 10 ML: 9 INJECTION INTRAMUSCULAR; INTRAVENOUS; SUBCUTANEOUS at 09:10

## 2025-08-22 RX ADMIN — HYDROCODONE BITARTRATE AND ACETAMINOPHEN 1 TABLET: 5; 325 TABLET ORAL at 11:14

## 2025-08-22 RX ADMIN — WATER 2000 MG: 1 INJECTION INTRAMUSCULAR; INTRAVENOUS; SUBCUTANEOUS at 08:28

## 2025-08-22 RX ADMIN — DIPHENHYDRAMINE HYDROCHLORIDE 12.5 MG: 50 INJECTION, SOLUTION INTRAMUSCULAR; INTRAVENOUS at 08:58

## 2025-08-22 RX ADMIN — FENTANYL CITRATE 100 MCG: 50 INJECTION, SOLUTION INTRAMUSCULAR; INTRAVENOUS at 08:35

## 2025-08-22 RX ADMIN — ONDANSETRON 4 MG: 2 INJECTION, SOLUTION INTRAMUSCULAR; INTRAVENOUS at 09:34

## 2025-08-22 RX ADMIN — Medication 20 MG: at 08:35

## 2025-08-22 RX ADMIN — PHENYLEPHRINE HYDROCHLORIDE 100 MCG: 10 INJECTION INTRAVENOUS at 09:39

## 2025-08-22 RX ADMIN — DEXAMETHASONE SODIUM PHOSPHATE 10 MG: 10 INJECTION INTRAMUSCULAR; INTRAVENOUS at 08:41

## 2025-08-22 RX ADMIN — PROPOFOL 100 MG: 10 INJECTION, EMULSION INTRAVENOUS at 08:35

## 2025-08-22 RX ADMIN — KETOROLAC TROMETHAMINE 30 MG: 30 INJECTION, SOLUTION INTRAMUSCULAR at 09:49

## 2025-08-22 RX ADMIN — FENTANYL CITRATE 100 MCG: 50 INJECTION, SOLUTION INTRAMUSCULAR; INTRAVENOUS at 08:57

## 2025-08-22 RX ADMIN — PHENYLEPHRINE HYDROCHLORIDE 100 MCG: 10 INJECTION INTRAVENOUS at 09:24

## 2025-08-22 RX ADMIN — PHENYLEPHRINE HYDROCHLORIDE 100 MCG: 10 INJECTION INTRAVENOUS at 08:52

## 2025-08-22 RX ADMIN — MIDAZOLAM 2 MG: 1 INJECTION INTRAMUSCULAR; INTRAVENOUS at 08:25

## 2025-08-22 RX ADMIN — SODIUM CHLORIDE, POTASSIUM CHLORIDE, SODIUM LACTATE AND CALCIUM CHLORIDE: 600; 310; 30; 20 INJECTION, SOLUTION INTRAVENOUS at 07:56

## 2025-08-22 RX ADMIN — GLYCOPYRROLATE 0.2 MG: 0.2 INJECTION INTRAMUSCULAR; INTRAVENOUS at 08:35

## 2025-08-22 RX ADMIN — LIDOCAINE HYDROCHLORIDE 80 MG: 20 INJECTION, SOLUTION INTRAVENOUS at 08:35

## 2025-08-22 RX ADMIN — FENTANYL CITRATE 50 MCG: 50 INJECTION, SOLUTION INTRAMUSCULAR; INTRAVENOUS at 09:10

## 2025-08-22 ASSESSMENT — PAIN DESCRIPTION - DESCRIPTORS
DESCRIPTORS: DULL;ACHING
DESCRIPTORS: ACHING;DULL
DESCRIPTORS: ACHING;DULL
DESCRIPTORS: DULL
DESCRIPTORS: DULL

## 2025-08-22 ASSESSMENT — PAIN - FUNCTIONAL ASSESSMENT
PAIN_FUNCTIONAL_ASSESSMENT: 0-10

## 2025-09-04 DIAGNOSIS — S52.501A CLOSED FRACTURE OF DISTAL END OF RIGHT RADIUS, UNSPECIFIED FRACTURE MORPHOLOGY, INITIAL ENCOUNTER: Primary | ICD-10-CM

## 2025-09-05 ENCOUNTER — OFFICE VISIT (OUTPATIENT)
Dept: ORTHOPEDIC SURGERY | Age: 70
End: 2025-09-05

## 2025-09-05 VITALS — WEIGHT: 201 LBS | BODY MASS INDEX: 35.61 KG/M2 | HEIGHT: 63 IN

## 2025-09-05 DIAGNOSIS — S52.501A CLOSED FRACTURE OF DISTAL END OF RIGHT RADIUS, UNSPECIFIED FRACTURE MORPHOLOGY, INITIAL ENCOUNTER: Primary | ICD-10-CM

## 2025-09-05 PROCEDURE — 99024 POSTOP FOLLOW-UP VISIT: CPT

## (undated) DEVICE — PACK PROC ORTH LO EXT IX CUST

## (undated) DEVICE — GLOVE ORANGE PI 8   MSG9080

## (undated) DEVICE — BIT DRL DIA2.5MM GRAD FOR 3.5MM LOK CORT SCR

## (undated) DEVICE — BANDAGE COMPR W4XL108IN WHT LAYERED NO CLSR SYN RUB ESMARCH

## (undated) DEVICE — BLADE SURG NO10 S STL REUSE HNDL CONVENTIONAL DISP

## (undated) DEVICE — CUFF TOURNIQUET 18 SNG BLADDER DUAL PORT

## (undated) DEVICE — COUNTER NDL 10 COUNT HLD 20 FOAM BLK SGL MAG

## (undated) DEVICE — CLOTH PREP W7.5XL7.5IN 2% CHG SKIN ALC AND RNS FREE

## (undated) DEVICE — Device

## (undated) DEVICE — BIT DRL DIA1.7MM LNG FT ANK FOR COMPHSVE SYS

## (undated) DEVICE — TUBING SUCT L10FT L0.1875IN CONN STR UNIV W/ RIB FEM CONN

## (undated) DEVICE — PEN SURG MARKING 9.5X12.75X5.5 IN STD PT REG TIP 9 LBL DEVON

## (undated) DEVICE — SOLUTION IRRIG 1000ML 0.9% SOD CHL USP POUR PLAS BTL

## (undated) DEVICE — TOWEL SURG W17XL27IN BLU COT STD PREWASHED STERILE 6 PER PK

## (undated) DEVICE — SYRINGE MED 20ML STD REG TIP AUTOCLV RIG PK MJCT

## (undated) DEVICE — APPLICATOR MEDICATED 26 CC SOLUTION HI LT ORNG CHLORAPREP

## (undated) DEVICE — GOWN SURG XL L48IN BLU SMS NONREINFORCED VELC TIE 3 LEV

## (undated) DEVICE — GLOVE ORTHO 8   MSG9480

## (undated) DEVICE — PADDING CAST 4 YDX3 IN COTTON NS WBRL

## (undated) DEVICE — SPONGE GZ 16 PLY WVN COT 4INX4IN STERIL

## (undated) DEVICE — BLADE SURG 15 SS STRL CISION LF DISP

## (undated) DEVICE — TAPE CAST W4INXL4YD WHT FBRGLS POLYUR RESIN LO TACK RIG

## (undated) DEVICE — SLING ARM 9 1/2X20IN L MULTIPAK

## (undated) DEVICE — BANDAGE COMPR W6INXL5YD SELF ADH COHESIVE CO FLX

## (undated) DEVICE — ELECTRODE NDL 2.8IN COAT VALLEYLAB

## (undated) DEVICE — COVER LT HNDL GRN PLAS SFT FLX 2 PER PK

## (undated) DEVICE — DRESSING GZ XRFRM 4X4(25/BX 6BX/CS)

## (undated) DEVICE — GUIDEWIRE ORTH L150MM DIA0.053IN W/ TRCR TIP FOR ANK FRAC

## (undated) DEVICE — TAPE CAST W3INXL4YD WHT FBRGLS POLYUR RESIN LO TACK RIG

## (undated) DEVICE — DRAPE C ARM W41XL65IN UNIV W/ CLP AND RUBBERBAND

## (undated) DEVICE — SPONGE GZ W4XL4IN 100% COT 16 PLY RADPQ HIGHLY ABSRB

## (undated) DEVICE — STRIP SKIN CLSR W0.25XL3IN WHT SENS ADH NONWOVEN HYPOALRG